# Patient Record
Sex: MALE | Race: WHITE | NOT HISPANIC OR LATINO | Employment: FULL TIME | URBAN - NONMETROPOLITAN AREA
[De-identification: names, ages, dates, MRNs, and addresses within clinical notes are randomized per-mention and may not be internally consistent; named-entity substitution may affect disease eponyms.]

---

## 2022-03-18 ENCOUNTER — HOSPITAL ENCOUNTER (EMERGENCY)
Facility: HOSPITAL | Age: 52
Discharge: HOME | End: 2022-03-18
Attending: EMERGENCY MEDICINE | Admitting: EMERGENCY MEDICINE
Payer: COMMERCIAL

## 2022-03-18 VITALS
TEMPERATURE: 97 F | RESPIRATION RATE: 18 BRPM | WEIGHT: 255 LBS | HEIGHT: 71 IN | BODY MASS INDEX: 35.7 KG/M2 | HEART RATE: 98 BPM | DIASTOLIC BLOOD PRESSURE: 93 MMHG | OXYGEN SATURATION: 96 % | SYSTOLIC BLOOD PRESSURE: 174 MMHG

## 2022-03-18 DIAGNOSIS — S01.311A LACERATION OF HELIX OF RIGHT EAR, INITIAL ENCOUNTER: Primary | ICD-10-CM

## 2022-03-18 PROCEDURE — 99284 EMERGENCY DEPT VISIT MOD MDM: CPT | Performed by: PHYSICIAN ASSISTANT

## 2022-03-18 PROCEDURE — 99282 EMERGENCY DEPT VISIT SF MDM: CPT

## 2022-03-18 PROCEDURE — 12011 RPR F/E/E/N/L/M 2.5 CM/<: CPT | Performed by: PHYSICIAN ASSISTANT

## 2022-03-18 RX ORDER — LIDOCAINE HYDROCHLORIDE 10 MG/ML
10 INJECTION, SOLUTION EPIDURAL; INFILTRATION; INTRACAUDAL; PERINEURAL ONCE
Status: COMPLETED | OUTPATIENT
Start: 2022-03-18 | End: 2022-03-18

## 2022-03-18 RX ORDER — CEPHALEXIN 250 MG/1
500 CAPSULE ORAL ONCE
Status: COMPLETED | OUTPATIENT
Start: 2022-03-18 | End: 2022-03-18

## 2022-03-18 RX ORDER — CEPHALEXIN 500 MG/1
500 CAPSULE ORAL EVERY 6 HOURS SCHEDULED
Qty: 28 CAPSULE | Refills: 0 | Status: SHIPPED | OUTPATIENT
Start: 2022-03-18 | End: 2022-03-25

## 2022-03-18 RX ADMIN — CEPHALEXIN 500 MG: 250 CAPSULE ORAL at 20:46

## 2022-03-18 RX ADMIN — LIDOCAINE HYDROCHLORIDE 10 ML: 10 INJECTION, SOLUTION EPIDURAL; INFILTRATION; INTRACAUDAL; PERINEURAL at 20:03

## 2022-03-18 NOTE — ED PROVIDER NOTES
History  Chief Complaint   Patient presents with    Ear Laceration     pt reports cutting his R ear on an overhead light switch  UTD on tetanus     The patient is a 68-year-old male who presents emergency department today with complaint a laceration ER was working in his attic and accidentally cut his right ear off of a over had lytes which  He states that his last tetanus was within last 2 years  He states due to concern for needing stitches he came in for evaluation denies any loss of consciousness headache, blurred vision  Patient states that this occurred approximately 1 hour prior to arrival      Laceration  Location:  Head/neck  Head/neck laceration location:  R ear  Length:  2 cm   Depth: Through dermis  Quality: jagged    Bleeding: controlled with pressure    Time since incident:  1 hour  Laceration mechanism:  Metal edge  Pain details:     Quality:  Throbbing    Severity:  Mild    Timing:  Constant    Progression:  Unchanged  Foreign body present:  No foreign bodies  Relieved by:  None tried  Worsened by:  Nothing  Ineffective treatments:  None tried  Tetanus status:  Up to date  Associated symptoms: no fever, no focal weakness, no numbness, no rash, no redness, no swelling and no streaking        None       Past Medical History:   Diagnosis Date    Hypertension        History reviewed  No pertinent surgical history  History reviewed  No pertinent family history  I have reviewed and agree with the history as documented  E-Cigarette/Vaping    E-Cigarette Use Never User      E-Cigarette/Vaping Substances     Social History     Tobacco Use    Smoking status: Never Smoker    Smokeless tobacco: Never Used   Vaping Use    Vaping Use: Never used   Substance Use Topics    Alcohol use: Yes    Drug use: Not Currently       Review of Systems   Constitutional: Negative for chills and fever  HENT: Negative for ear pain  Eyes: Negative for pain and visual disturbance     Respiratory: Negative for shortness of breath and stridor  Cardiovascular: Negative for chest pain and palpitations  Gastrointestinal: Negative for abdominal pain, nausea and vomiting  Genitourinary: Negative for dysuria and hematuria  Musculoskeletal: Negative for arthralgias and back pain  Skin: Negative for color change and rash  Neurological: Negative for focal weakness, seizures and speech difficulty  All other systems reviewed and are negative  Physical Exam  Physical Exam  Vitals and nursing note reviewed  Constitutional:       Appearance: He is well-developed  HENT:      Head: Normocephalic and atraumatic  Ears:     Eyes:      Extraocular Movements: Extraocular movements intact  Conjunctiva/sclera: Conjunctivae normal       Pupils: Pupils are equal, round, and reactive to light  Cardiovascular:      Rate and Rhythm: Normal rate and regular rhythm  Heart sounds: No murmur heard  Pulmonary:      Effort: Pulmonary effort is normal  No respiratory distress  Breath sounds: Normal breath sounds  Abdominal:      Palpations: Abdomen is soft  Tenderness: There is no abdominal tenderness  Musculoskeletal:      Cervical back: Neck supple  Skin:     General: Skin is warm and dry  Neurological:      General: No focal deficit present  Mental Status: He is alert and oriented to person, place, and time  GCS: GCS eye subscore is 4  GCS verbal subscore is 5  GCS motor subscore is 6  Gait: Gait is intact               Vital Signs  ED Triage Vitals [03/18/22 1953]   Temperature Pulse Respirations Blood Pressure SpO2   (!) 97 °F (36 1 °C) 98 18 (!) 174/93 96 %      Temp Source Heart Rate Source Patient Position - Orthostatic VS BP Location FiO2 (%)   Temporal Monitor Sitting Right arm --      Pain Score       --           Vitals:    03/18/22 1953   BP: (!) 174/93   Pulse: 98   Patient Position - Orthostatic VS: Sitting         Visual Acuity      ED Medications  Medications lidocaine (PF) (XYLOCAINE-MPF) 1 % injection 10 mL (10 mL Infiltration Given 3/18/22 2003)   cephalexin (KEFLEX) capsule 500 mg (500 mg Oral Given 3/18/22 2046)       Diagnostic Studies  Results Reviewed     None                 No orders to display              Procedures  Laceration repair    Date/Time: 3/18/2022 11:46 PM  Performed by: Jaxon Tovar PA-C  Authorized by: Jaxon Tovar PA-C   Consent: Verbal consent obtained  Risks and benefits: risks, benefits and alternatives were discussed  Consent given by: patient  Patient identity confirmed: verbally with patient  Body area: head/neck  Location details: right ear  Laceration length: 2 cm  Tendon involvement: none  Nerve involvement: none  Anesthesia: nerve block    Anesthesia:  Local Anesthetic: lidocaine 1% without epinephrine  Anesthetic total: 5 mL    Wound Dehiscence:  Superficial Wound Dehiscence: simple closure      Procedure Details:  Preparation: Patient was prepped and draped in the usual sterile fashion  Irrigation solution: tap water  Irrigation method: syringe  Amount of cleaning: standard  Debridement: none  Degree of undermining: none  Skin closure: Ethilon  Number of sutures: 7  Technique: simple  Approximation: close  Approximation difficulty: simple  Dressing: non-adhesive packing strip and 4x4 sterile gauze  Patient tolerance: patient tolerated the procedure well with no immediate complications               ED Course  ED Course as of 03/18/22 2349   Fri Mar 18, 2022   2044 7stitches placed                                 SBIRT 22yo+      Most Recent Value   SBIRT (25 yo +)    In order to provide better care to our patients, we are screening all of our patients for alcohol and drug use  Would it be okay to ask you these screening questions? Yes Filed at: 03/18/2022 2000   Initial Alcohol Screen: US AUDIT-C     1  How often do you have a drink containing alcohol? 0 Filed at: 03/18/2022 2000   2   How many drinks containing alcohol do you have on a typical day you are drinking? 0 Filed at: 03/18/2022 2000   3a  Male UNDER 65: How often do you have five or more drinks on one occasion? 0 Filed at: 03/18/2022 2000   3b  FEMALE Any Age, or MALE 65+: How often do you have 4 or more drinks on one occassion? 0 Filed at: 03/18/2022 2000   Audit-C Score 0 Filed at: 03/18/2022 2000   DUSTIN: How many times in the past year have you    Used an illegal drug or used a prescription medication for non-medical reasons? Never Filed at: 03/18/2022 2000                    [facility]  Number of Diagnoses or Management Options     Amount and/or Complexity of Data Reviewed  Decide to obtain previous medical records or to obtain history from someone other than the patient: yes  Review and summarize past medical records: yes  Independent visualization of images, tracings, or specimens: yes    Risk of Complications, Morbidity, and/or Mortality  Presenting problems: low  Diagnostic procedures: low  Management options: low    Patient Progress  Patient progress: improved      Disposition  Final diagnoses:   Laceration of helix of right ear, initial encounter     Time reflects when diagnosis was documented in both MDM as applicable and the Disposition within this note     Time User Action Codes Description Comment    3/18/2022  8:43 PM Ramona Samaniego Add [S01 311A] Laceration of helix of right ear, initial encounter       ED Disposition     ED Disposition Condition Date/Time Comment    Discharge Stable Fri Mar 18, 2022  8:43 PM Alvin Massy  discharge to home/self care  Follow-up Information    None         Discharge Medication List as of 3/18/2022  8:44 PM      START taking these medications    Details   cephalexin (KEFLEX) 500 mg capsule Take 1 capsule (500 mg total) by mouth every 6 (six) hours for 7 days, Starting Fri 3/18/2022, Until Fri 3/25/2022, Normal             No discharge procedures on file      PDMP Review     None          ED Provider  Electronically Signed by           Alem Hinojosa PA-C  03/18/22 2589

## 2024-12-12 ENCOUNTER — HOSPITAL ENCOUNTER (OUTPATIENT)
Facility: HOSPITAL | Age: 54
Setting detail: OBSERVATION
Discharge: HOME | End: 2024-12-13
Attending: EMERGENCY MEDICINE | Admitting: INTERNAL MEDICINE
Payer: COMMERCIAL

## 2024-12-12 ENCOUNTER — APPOINTMENT (EMERGENCY)
Dept: CT IMAGING | Facility: HOSPITAL | Age: 54
End: 2024-12-12
Payer: COMMERCIAL

## 2024-12-12 DIAGNOSIS — R52 INTRACTABLE PAIN: ICD-10-CM

## 2024-12-12 DIAGNOSIS — S39.012A STRAIN OF LUMBAR REGION, INITIAL ENCOUNTER: Primary | ICD-10-CM

## 2024-12-12 PROBLEM — I10 HYPERTENSION: Status: ACTIVE | Noted: 2024-12-12

## 2024-12-12 PROBLEM — E11.9 TYPE 2 DIABETES MELLITUS WITHOUT COMPLICATION, WITHOUT LONG-TERM CURRENT USE OF INSULIN (HCC): Status: ACTIVE | Noted: 2024-12-12

## 2024-12-12 PROBLEM — M54.59 INTRACTABLE LOW BACK PAIN: Status: ACTIVE | Noted: 2024-12-12

## 2024-12-12 LAB
ANION GAP SERPL CALCULATED.3IONS-SCNC: 10 MMOL/L (ref 4–13)
BASOPHILS # BLD AUTO: 0.04 THOUSANDS/ÂΜL (ref 0–0.1)
BASOPHILS NFR BLD AUTO: 1 % (ref 0–1)
BUN SERPL-MCNC: 13 MG/DL (ref 5–25)
CALCIUM SERPL-MCNC: 9.5 MG/DL (ref 8.4–10.2)
CHLORIDE SERPL-SCNC: 101 MMOL/L (ref 96–108)
CO2 SERPL-SCNC: 25 MMOL/L (ref 21–32)
CREAT SERPL-MCNC: 0.8 MG/DL (ref 0.6–1.3)
EOSINOPHIL # BLD AUTO: 0.09 THOUSAND/ÂΜL (ref 0–0.61)
EOSINOPHIL NFR BLD AUTO: 1 % (ref 0–6)
ERYTHROCYTE [DISTWIDTH] IN BLOOD BY AUTOMATED COUNT: 11.6 % (ref 11.6–15.1)
GFR SERPL CREATININE-BSD FRML MDRD: 102 ML/MIN/1.73SQ M
GLUCOSE SERPL-MCNC: 156 MG/DL (ref 65–140)
HCT VFR BLD AUTO: 46.6 % (ref 36.5–49.3)
HGB BLD-MCNC: 15.9 G/DL (ref 12–17)
IMM GRANULOCYTES # BLD AUTO: 0.03 THOUSAND/UL (ref 0–0.2)
IMM GRANULOCYTES NFR BLD AUTO: 0 % (ref 0–2)
LYMPHOCYTES # BLD AUTO: 0.79 THOUSANDS/ÂΜL (ref 0.6–4.47)
LYMPHOCYTES NFR BLD AUTO: 10 % (ref 14–44)
MCH RBC QN AUTO: 32.3 PG (ref 26.8–34.3)
MCHC RBC AUTO-ENTMCNC: 34.1 G/DL (ref 31.4–37.4)
MCV RBC AUTO: 95 FL (ref 82–98)
MONOCYTES # BLD AUTO: 0.53 THOUSAND/ÂΜL (ref 0.17–1.22)
MONOCYTES NFR BLD AUTO: 6 % (ref 4–12)
NEUTROPHILS # BLD AUTO: 6.8 THOUSANDS/ÂΜL (ref 1.85–7.62)
NEUTS SEG NFR BLD AUTO: 82 % (ref 43–75)
NRBC BLD AUTO-RTO: 0 /100 WBCS
PLATELET # BLD AUTO: 277 THOUSANDS/UL (ref 149–390)
PMV BLD AUTO: 9.8 FL (ref 8.9–12.7)
POTASSIUM SERPL-SCNC: 3.9 MMOL/L (ref 3.5–5.3)
RBC # BLD AUTO: 4.93 MILLION/UL (ref 3.88–5.62)
SODIUM SERPL-SCNC: 136 MMOL/L (ref 135–147)
WBC # BLD AUTO: 8.28 THOUSAND/UL (ref 4.31–10.16)

## 2024-12-12 PROCEDURE — 96361 HYDRATE IV INFUSION ADD-ON: CPT

## 2024-12-12 PROCEDURE — 36415 COLL VENOUS BLD VENIPUNCTURE: CPT | Performed by: EMERGENCY MEDICINE

## 2024-12-12 PROCEDURE — 99223 1ST HOSP IP/OBS HIGH 75: CPT | Performed by: INTERNAL MEDICINE

## 2024-12-12 PROCEDURE — 96375 TX/PRO/DX INJ NEW DRUG ADDON: CPT

## 2024-12-12 PROCEDURE — 96374 THER/PROPH/DIAG INJ IV PUSH: CPT

## 2024-12-12 PROCEDURE — 85025 COMPLETE CBC W/AUTO DIFF WBC: CPT | Performed by: EMERGENCY MEDICINE

## 2024-12-12 PROCEDURE — 99285 EMERGENCY DEPT VISIT HI MDM: CPT | Performed by: EMERGENCY MEDICINE

## 2024-12-12 PROCEDURE — 99284 EMERGENCY DEPT VISIT MOD MDM: CPT

## 2024-12-12 PROCEDURE — 80048 BASIC METABOLIC PNL TOTAL CA: CPT | Performed by: EMERGENCY MEDICINE

## 2024-12-12 PROCEDURE — 74177 CT ABD & PELVIS W/CONTRAST: CPT

## 2024-12-12 PROCEDURE — 96376 TX/PRO/DX INJ SAME DRUG ADON: CPT

## 2024-12-12 RX ORDER — METHOCARBAMOL 500 MG/1
500 TABLET, FILM COATED ORAL EVERY 6 HOURS SCHEDULED
Status: DISCONTINUED | OUTPATIENT
Start: 2024-12-13 | End: 2024-12-13 | Stop reason: HOSPADM

## 2024-12-12 RX ORDER — HYDROMORPHONE HCL/PF 1 MG/ML
1 SYRINGE (ML) INJECTION ONCE
Refills: 0 | Status: COMPLETED | OUTPATIENT
Start: 2024-12-12 | End: 2024-12-12

## 2024-12-12 RX ORDER — ONDANSETRON 2 MG/ML
4 INJECTION INTRAMUSCULAR; INTRAVENOUS EVERY 4 HOURS PRN
Status: DISCONTINUED | OUTPATIENT
Start: 2024-12-12 | End: 2024-12-13 | Stop reason: HOSPADM

## 2024-12-12 RX ORDER — LISINOPRIL 20 MG/1
20 TABLET ORAL DAILY
Status: DISCONTINUED | OUTPATIENT
Start: 2024-12-13 | End: 2024-12-13 | Stop reason: HOSPADM

## 2024-12-12 RX ORDER — ACETAMINOPHEN 325 MG/1
975 TABLET ORAL EVERY 8 HOURS SCHEDULED
Status: DISCONTINUED | OUTPATIENT
Start: 2024-12-12 | End: 2024-12-13 | Stop reason: HOSPADM

## 2024-12-12 RX ORDER — HYDROMORPHONE HCL/PF 1 MG/ML
0.5 SYRINGE (ML) INJECTION ONCE
Status: DISCONTINUED | OUTPATIENT
Start: 2024-12-12 | End: 2024-12-12

## 2024-12-12 RX ORDER — LISINOPRIL 20 MG/1
20 TABLET ORAL DAILY
COMMUNITY

## 2024-12-12 RX ORDER — KETOROLAC TROMETHAMINE 30 MG/ML
30 INJECTION, SOLUTION INTRAMUSCULAR; INTRAVENOUS ONCE
Status: COMPLETED | OUTPATIENT
Start: 2024-12-12 | End: 2024-12-12

## 2024-12-12 RX ORDER — OXYCODONE HYDROCHLORIDE 5 MG/1
5 TABLET ORAL EVERY 4 HOURS PRN
Refills: 0 | Status: DISCONTINUED | OUTPATIENT
Start: 2024-12-12 | End: 2024-12-13 | Stop reason: HOSPADM

## 2024-12-12 RX ORDER — KETOROLAC TROMETHAMINE 30 MG/ML
15 INJECTION, SOLUTION INTRAMUSCULAR; INTRAVENOUS EVERY 6 HOURS PRN
Status: DISCONTINUED | OUTPATIENT
Start: 2024-12-12 | End: 2024-12-13 | Stop reason: HOSPADM

## 2024-12-12 RX ORDER — AMOXICILLIN 250 MG
1 CAPSULE ORAL
Status: DISCONTINUED | OUTPATIENT
Start: 2024-12-12 | End: 2024-12-13 | Stop reason: HOSPADM

## 2024-12-12 RX ORDER — DIAZEPAM 10 MG/2ML
5 INJECTION, SOLUTION INTRAMUSCULAR; INTRAVENOUS ONCE
Status: DISCONTINUED | OUTPATIENT
Start: 2024-12-12 | End: 2024-12-12

## 2024-12-12 RX ORDER — DIAZEPAM 10 MG/2ML
5 INJECTION, SOLUTION INTRAMUSCULAR; INTRAVENOUS ONCE
Status: COMPLETED | OUTPATIENT
Start: 2024-12-12 | End: 2024-12-12

## 2024-12-12 RX ORDER — HYDROMORPHONE HCL IN WATER/PF 6 MG/30 ML
0.2 PATIENT CONTROLLED ANALGESIA SYRINGE INTRAVENOUS EVERY 6 HOURS PRN
Status: DISCONTINUED | OUTPATIENT
Start: 2024-12-12 | End: 2024-12-13 | Stop reason: HOSPADM

## 2024-12-12 RX ORDER — ENOXAPARIN SODIUM 100 MG/ML
40 INJECTION SUBCUTANEOUS
Status: DISCONTINUED | OUTPATIENT
Start: 2024-12-13 | End: 2024-12-13 | Stop reason: HOSPADM

## 2024-12-12 RX ORDER — LIDOCAINE 50 MG/G
1 PATCH TOPICAL DAILY
Status: DISCONTINUED | OUTPATIENT
Start: 2024-12-13 | End: 2024-12-13 | Stop reason: HOSPADM

## 2024-12-12 RX ADMIN — ACETAMINOPHEN 325MG 975 MG: 325 TABLET ORAL at 22:07

## 2024-12-12 RX ADMIN — IOHEXOL 100 ML: 350 INJECTION, SOLUTION INTRAVENOUS at 19:38

## 2024-12-12 RX ADMIN — HYDROMORPHONE HYDROCHLORIDE 1 MG: 1 INJECTION, SOLUTION INTRAMUSCULAR; INTRAVENOUS; SUBCUTANEOUS at 20:05

## 2024-12-12 RX ADMIN — METHOCARBAMOL TABLETS 500 MG: 500 TABLET, COATED ORAL at 23:21

## 2024-12-12 RX ADMIN — SENNOSIDES AND DOCUSATE SODIUM 1 TABLET: 8.6; 5 TABLET ORAL at 22:07

## 2024-12-12 RX ADMIN — DIAZEPAM 5 MG: 5 INJECTION INTRAMUSCULAR; INTRAVENOUS at 20:06

## 2024-12-12 RX ADMIN — HYDROMORPHONE HYDROCHLORIDE 1 MG: 1 INJECTION, SOLUTION INTRAMUSCULAR; INTRAVENOUS; SUBCUTANEOUS at 19:01

## 2024-12-12 RX ADMIN — KETOROLAC TROMETHAMINE 30 MG: 30 INJECTION, SOLUTION INTRAMUSCULAR; INTRAVENOUS at 20:01

## 2024-12-12 RX ADMIN — SODIUM CHLORIDE 1000 ML: 0.9 INJECTION, SOLUTION INTRAVENOUS at 19:00

## 2024-12-12 NOTE — ED PROVIDER NOTES
Time reflects when diagnosis was documented in both MDM as applicable and the Disposition within this note       Time User Action Codes Description Comment    12/12/2024  8:45 PM Ghanshyam Thrasher Add [S39.012A] Strain of lumbar region, initial encounter     12/12/2024  8:46 PM Ghanshyam Thrasher Add [R52] Intractable pain           ED Disposition       ED Disposition   Admit    Condition   Stable    Date/Time   Thu Dec 12, 2024  8:45 PM    Comment   Case was discussed with Naldo and the patient's admission status was agreed to be Admission Status: observation status to the service of Dr. Hitchcock .               Assessment & Plan       Medical Decision Making  This patient presents with left lower back pain.   Diagnostic considerations include lumbar strain, renal colic,. See ED Course.       Amount and/or Complexity of Data Reviewed  Labs: ordered. Decision-making details documented in ED Course.  Radiology: ordered and independent interpretation performed. Decision-making details documented in ED Course.  ECG/medicine tests: ordered and independent interpretation performed. Decision-making details documented in ED Course.    Risk  Prescription drug management.        ED Course as of 12/12/24 2048   Thu Dec 12, 2024   1929 WBC: 8.28   1929 GFR, Calculated: 102   1953 Deep tendon reflexes 2/4 at knees and ankles bilaterally  Notes pain improved when still in supine, but remained severe with any/minimal movement   2038 Supine in bed unable to move without spasmodic left lower back pain, agreeable with additional analgesic and antispasmodic, discussed options including observation hospital and agreeable, spouse present and supportive       Medications   diazepam (VALIUM) injection 5 mg (has no administration in time range)   HYDROmorphone (DILAUDID) injection 0.5 mg (has no administration in time range)   sodium chloride 0.9 % bolus 1,000 mL (0 mL Intravenous Stopped 12/12/24 2000)   HYDROmorphone (DILAUDID) injection  1 mg (1 mg Intravenous Given 12/12/24 1901)   iohexol (OMNIPAQUE) 350 MG/ML injection (MULTI-DOSE) 100 mL (100 mL Intravenous Given 12/12/24 1938)   HYDROmorphone (DILAUDID) injection 1 mg (1 mg Intravenous Given 12/12/24 2005)   ketorolac (TORADOL) injection 30 mg (30 mg Intravenous Given 12/12/24 2001)   diazepam (VALIUM) injection 5 mg (5 mg Intravenous Given 12/12/24 2006)       ED Risk Strat Scores                                              History of Present Illness       Chief Complaint   Patient presents with    Back Pain     Back pain worsening over a week. Denies injury       Past Medical History:   Diagnosis Date    Hypertension       History reviewed. No pertinent surgical history.   History reviewed. No pertinent family history.   Social History     Tobacco Use    Smoking status: Never    Smokeless tobacco: Never   Vaping Use    Vaping status: Never Used   Substance Use Topics    Alcohol use: Yes    Drug use: Not Currently      E-Cigarette/Vaping    E-Cigarette Use Never User       E-Cigarette/Vaping Substances      I have reviewed and agree with the history as documented.     54-year-old male accompanied by female describes severe left lower back pain ongoing for about a week, worse with movement, hunting and believes related to positioning and pulled something, but markedly worse this morning when got out of bed, movement increases pain.  No numbness or weakness.  No fever or chills.  No direct injury.      History provided by:  Patient  Back Pain  Location:  Lumbar spine  Quality:  Aching  Radiates to:  Does not radiate  Pain severity:  Severe  Onset quality:  Gradual  Duration:  7 days  Timing:  Constant  Progression:  Worsening  Chronicity:  New  Context: physical stress    Relieved by:  Nothing  Worsened by:  Movement  Ineffective treatments:  NSAIDs  Associated symptoms: no bladder incontinence, no bowel incontinence, no fever, no numbness and no paresthesias    Risk factors: no hx of cancer         Review of Systems   Constitutional:  Negative for fever.   Gastrointestinal:  Negative for bowel incontinence.   Genitourinary:  Negative for bladder incontinence.   Musculoskeletal:  Positive for back pain.   Neurological:  Negative for numbness and paresthesias.   All other systems reviewed and are negative.          Objective       ED Triage Vitals   Temperature Pulse Blood Pressure Respirations SpO2 Patient Position - Orthostatic VS   12/12/24 1834 12/12/24 1834 12/12/24 1835 12/12/24 1834 12/12/24 1834 12/12/24 1915   98.2 °F (36.8 °C) 93 (!) 193/127 18 98 % Lying      Temp src Heart Rate Source BP Location FiO2 (%) Pain Score    -- 12/12/24 1834 12/12/24 1834 -- 12/12/24 1901     Monitor Left arm  6      Vitals      Date and Time Temp Pulse SpO2 Resp BP Pain Score FACES Pain Rating User   12/12/24 2005 -- -- -- -- -- 6 --    12/12/24 2001 -- -- -- -- -- 6 --    12/12/24 1930 -- 77 95 % 18 153/77 -- --    12/12/24 1915 -- 74 92 % 18 162/81 -- --    12/12/24 1901 -- -- -- -- -- 6 WITHOUT MOVEMENT, 10 WITH MOVEMENT --    12/12/24 1900 -- 85 93 % 18 157/91 -- --    12/12/24 1835 -- -- -- -- 193/127 -- --    12/12/24 1834 98.2 °F (36.8 °C) 93 98 % 18 -- -- -- SS            Physical Exam  Vitals and nursing note reviewed.   Constitutional:       General: He is not in acute distress.     Appearance: Normal appearance.      Comments: Pleasant, comfortable appearing, conversational, articulate   HENT:      Head: Normocephalic and atraumatic.      Right Ear: External ear normal.      Left Ear: External ear normal.      Nose: Nose normal.      Mouth/Throat:      Mouth: Mucous membranes are moist.   Eyes:      Extraocular Movements: Extraocular movements intact.      Pupils: Pupils are equal, round, and reactive to light.   Cardiovascular:      Rate and Rhythm: Normal rate and regular rhythm.      Heart sounds: No murmur heard.  Pulmonary:      Effort: Pulmonary effort is normal. No respiratory  distress.      Breath sounds: Normal breath sounds. No wheezing or rales.   Abdominal:      General: Abdomen is flat. Bowel sounds are normal. There is no distension.      Tenderness: There is no abdominal tenderness. There is no guarding or rebound.   Musculoskeletal:      Cervical back: Neck supple.      Right lower leg: No edema.      Left lower leg: No edema.      Comments: Left paralumbar muscular tenderness, no midline spinal tenderness in cervical, thoracic or lumbar spine, no overlying skin changes, great toe strength intact in flexion extension bilaterally, saddle area sensation intact, ambulates with good balance and discomfort   Skin:     General: Skin is warm and dry.   Neurological:      General: No focal deficit present.      Mental Status: He is alert and oriented to person, place, and time.      Cranial Nerves: No cranial nerve deficit.      Sensory: No sensory deficit.      Motor: No weakness.      Coordination: Coordination normal.   Psychiatric:         Mood and Affect: Mood normal.         Behavior: Behavior normal.         Results Reviewed       Procedure Component Value Units Date/Time    Basic metabolic panel [[id number]]  (Abnormal) Collected: 12/12/24 1859    Lab Status: Final result Specimen: Blood from Arm, Right Updated: 12/12/24 1924     Sodium 136 mmol/L      Potassium 3.9 mmol/L      Chloride 101 mmol/L      CO2 25 mmol/L      ANION GAP 10 mmol/L      BUN 13 mg/dL      Creatinine 0.80 mg/dL      Glucose 156 mg/dL      Calcium 9.5 mg/dL      eGFR 102 ml/min/1.73sq m     Narrative:      National Kid[employer] Foundation guidelines for Chronic Kidney Disease (CKD):     Stage 1 with normal or high GFR (GFR > 90 mL/min/1.73 square meters)    Stage 2 Mild CKD (GFR = 60-89 mL/min/1.73 square meters)    Stage 3A Moderate CKD (GFR = 45-59 mL/min/1.73 square meters)    Stage 3B Moderate CKD (GFR = 30-44 mL/min/1.73 square meters)    Stage 4 Severe CKD (GFR = 15-29 mL/min/1.73 square meters)     Stage 5 End Stage CKD (GFR <15 mL/min/1.73 square meters)  Note: GFR calculation is accurate only with a steady state creatinine    CBC and differential [[id number]]  (Abnormal) Collected: 12/12/24 1859    Lab Status: Final result Specimen: Blood from Arm, Right Updated: 12/12/24 1911     WBC 8.28 Thousand/uL      RBC 4.93 Million/uL      Hemoglobin 15.9 g/dL      Hematocrit 46.6 %      MCV 95 fL      MCH 32.3 pg      MCHC 34.1 g/dL      RDW 11.6 %      MPV 9.8 fL      Platelets 277 Thousands/uL      nRBC 0 /100 WBCs      Segmented % 82 %      Immature Grans % 0 %      Lymphocytes % 10 %      Monocytes % 6 %      Eosinophils Relative 1 %      Basophils Relative 1 %      Absolute Neutrophils 6.80 Thousands/µL      Absolute Immature Grans 0.03 Thousand/uL      Absolute Lymphocytes 0.79 Thousands/µL      Absolute Monocytes 0.53 Thousand/µL      Eosinophils Absolute 0.09 Thousand/µL      Basophils Absolute 0.04 Thousands/µL             CT abdomen pelvis with contrast    (Results Pending)       Procedures    ED Medication and Procedure Management   None     Patient's Medications    No medications on file     No discharge procedures on file.  ED SEPSIS DOCUMENTATION   Time reflects when diagnosis was documented in both MDM as applicable and the Disposition within this note       Time User Action Codes Description Comment    12/12/2024  8:45 PM Ghanshyam Thrasher Add [S39.012A] Strain of lumbar region, initial encounter     12/12/2024  8:46 PM Ghanshyam Thrasher Add [R52] Intractable pain                  Ghanshyam Thrasher DO  12/12/24 2048

## 2024-12-12 NOTE — ED NOTES
Pt states he took 1000mg naproxen around 3pm but didn't feel it helped any. Pt states he was unable to get out of his chair today      Chayo Milton RN  12/12/24 0896

## 2024-12-13 VITALS
RESPIRATION RATE: 20 BRPM | OXYGEN SATURATION: 96 % | BODY MASS INDEX: 36.31 KG/M2 | SYSTOLIC BLOOD PRESSURE: 149 MMHG | HEART RATE: 61 BPM | HEIGHT: 70 IN | DIASTOLIC BLOOD PRESSURE: 84 MMHG | TEMPERATURE: 97.7 F | WEIGHT: 253.6 LBS

## 2024-12-13 PROCEDURE — 99239 HOSP IP/OBS DSCHRG MGMT >30: CPT | Performed by: FAMILY MEDICINE

## 2024-12-13 RX ORDER — IBUPROFEN 600 MG/1
600 TABLET, FILM COATED ORAL EVERY 8 HOURS PRN
Qty: 30 TABLET | Refills: 0 | Status: SHIPPED | OUTPATIENT
Start: 2024-12-13

## 2024-12-13 RX ORDER — METHOCARBAMOL 500 MG/1
500 TABLET, FILM COATED ORAL EVERY 6 HOURS PRN
Qty: 20 TABLET | Refills: 0 | Status: SHIPPED | OUTPATIENT
Start: 2024-12-13

## 2024-12-13 RX ADMIN — METHOCARBAMOL TABLETS 500 MG: 500 TABLET, COATED ORAL at 05:21

## 2024-12-13 RX ADMIN — LIDOCAINE 1 PATCH: 50 PATCH CUTANEOUS at 08:18

## 2024-12-13 RX ADMIN — LISINOPRIL 20 MG: 20 TABLET ORAL at 08:18

## 2024-12-13 RX ADMIN — ACETAMINOPHEN 325MG 975 MG: 325 TABLET ORAL at 05:21

## 2024-12-13 NOTE — PLAN OF CARE
Problem: PAIN - ADULT  Goal: Verbalizes/displays adequate comfort level or baseline comfort level  Description: Interventions:  - Encourage patient to monitor pain and request assistance  - Assess pain using appropriate pain scale  - Administer analgesics based on type and severity of pain and evaluate response  - Implement non-pharmacological measures as appropriate and evaluate response  - Consider cultural and social influences on pain and pain management  - Notify physician/advanced practitioner if interventions unsuccessful or patient reports new pain  Outcome: Progressing     Problem: SAFETY ADULT  Goal: Patient will remain free of falls  Description: INTERVENTIONS:  - Educate patient/family on patient safety including physical limitations  - Instruct patient to call for assistance with activity   - Consult OT/PT to assist with strengthening/mobility   - Keep Call bell within reach  - Keep bed low and locked with side rails adjusted as appropriate  - Keep care items and personal belongings within reach  - Initiate and maintain comfort rounds  - Make Fall Risk Sign visible to staff  - Offer Toileting every    Hours, in advance of need  - Initiate/Maintain   alarm  - Obtain necessary fall risk management equipment:     - Apply yellow socks and bracelet for high fall risk patients  - Consider moving patient to room near nurses station  Outcome: Progressing  Goal: Maintain or return to baseline ADL function  Description: INTERVENTIONS:  -  Assess patient's ability to carry out ADLs; assess patient's baseline for ADL function and identify physical deficits which impact ability to perform ADLs (bathing, care of mouth/teeth, toileting, grooming, dressing, etc.)  - Assess/evaluate cause of self-care deficits   - Assess range of motion  - Assess patient's mobility; develop plan if impaired  - Assess patient's need for assistive devices and provide as appropriate  - Encourage maximum independence but intervene and  supervise when necessary  - Involve family in performance of ADLs  - Assess for home care needs following discharge   - Consider OT consult to assist with ADL evaluation and planning for discharge  - Provide patient education as appropriate  Outcome: Progressing  Goal: Maintains/Returns to pre admission functional level  Description: INTERVENTIONS:  - Perform AM-PAC 6 Click Basic Mobility/ Daily Activity assessment daily.  - Set and communicate daily mobility goal to care team and patient/family/caregiver.   - Collaborate with rehabilitation services on mobility goals if consulted  - Perform Range of Motion    times a day.  - Reposition patient every    hours.  - Dangle patient    times a day  - Stand patient    times a day  - Ambulate patient    times a day  - Out of bed to chair    times a day   - Out of bed for meals      times a day  - Out of bed for toileting  - Record patient progress and toleration of activity level   Outcome: Progressing     Problem: DISCHARGE PLANNING  Goal: Discharge to home or other facility with appropriate resources  Description: INTERVENTIONS:  - Identify barriers to discharge w/patient and caregiver  - Arrange for needed discharge resources and transportation as appropriate  - Identify discharge learning needs (meds, wound care, etc.)  - Arrange for interpretive services to assist at discharge as needed  - Refer to Case Management Department for coordinating discharge planning if the patient needs post-hospital services based on physician/advanced practitioner order or complex needs related to functional status, cognitive ability, or social support system  Outcome: Progressing     Problem: Knowledge Deficit  Goal: Patient/family/caregiver demonstrates understanding of disease process, treatment plan, medications, and discharge instructions  Description: Complete learning assessment and assess knowledge base.  Interventions:  - Provide teaching at level of understanding  - Provide  teaching via preferred learning methods  Outcome: Progressing

## 2024-12-13 NOTE — ASSESSMENT & PLAN NOTE
"Lab Results   Component Value Date    HGBA1C 6.7 (H) 09/10/2024       No results for input(s): \"POCGLU\" in the last 72 hours.    Blood Sugar Average: Last 72 hrs:  Not on any medications.  Continue with serial blood glucose monitoring and sliding scale insulin.    "

## 2024-12-13 NOTE — UTILIZATION REVIEW
Initial Clinical Review    Admission: Date/Time/Statement:   Admission Orders (From admission, onward)       Ordered        12/12/24 2048  Place in Observation  Once                          Orders Placed This Encounter   Procedures    Place in Observation     Standing Status:   Standing     Number of Occurrences:   1     Level of Care:   Med Surg [16]     ED Arrival Information       Expected   -    Arrival   12/12/2024 18:29    Acuity   Less Urgent              Means of arrival   Walk-In    Escorted by   Family Member    Service   Hospitalist    Admission type   Emergency              Arrival complaint   Back Pain             Chief Complaint   Patient presents with    Back Pain     Back pain worsening over a week. Denies injury       Initial Presentation: 54 y.o. male to ED via walk-in from home  Present to ED with worsening back pain. Recently tripped and fell a couple of time while in the woods hunting. Endorses was unable to get out of bed today to walk. Pain located in the left lower back without any radiation.   PMHX hypertension   Admitted to OBS with DX: back pain   on exam: hypertensive; Mild lower lumbar tenderness; pain 6/10   PLAN: pain control (see below); monitor labs; PT/ OT eval - tx; monitor / trend BP's       Anticipated Length of Stay/Certification Statement: Patient will be admitted on an observation basis with an anticipated length of stay of less than 2 midnights secondary to management as documented above.           ED Treatment-Medication Administration from 12/12/2024 1826 to 12/12/2024 2118         Date/Time Order Dose Route Action     12/12/2024 1900 sodium chloride 0.9 % bolus 1,000 mL 1,000 mL Intravenous New Bag     12/12/2024 1901 HYDROmorphone (DILAUDID) injection 1 mg 1 mg Intravenous Given     12/12/2024 1938 iohexol (OMNIPAQUE) 350 MG/ML injection (MULTI-DOSE) 100 mL 100 mL Intravenous Given     12/12/2024 2005 HYDROmorphone (DILAUDID) injection 1 mg 1 mg Intravenous Given      12/12/2024 2001 ketorolac (TORADOL) injection 30 mg 30 mg Intravenous Given     12/12/2024 2006 diazepam (VALIUM) injection 5 mg 5 mg Intravenous Given            Scheduled Medications:  acetaminophen, 975 mg, Oral, Q8H [facility]  enoxaparin, 40 mg, Subcutaneous, Q24H [facility]  lidocaine, 1 patch, Topical, Daily  lisinopril, 20 mg, Oral, Daily  methocarbamol, 500 mg, Oral, Q6H [facility]  senna-docusate sodium, 1 tablet, Oral, HS      Continuous IV Infusions:     PRN Meds:  HYDROmorphone, 0.2 mg, Intravenous, Q6H PRN  ketorolac, 15 mg, Intravenous, Q6H PRN  naloxone, 0.04 mg, Intravenous, Q1MIN PRN  ondansetron, 4 mg, Intravenous, Q4H PRN  oxyCODONE, 5 mg, Oral, Q4H PRN      ED Triage Vitals   Temperature Pulse Respirations Blood Pressure SpO2 Pain Score   12/12/24 1834 12/12/24 1834 12/12/24 1834 12/12/24 1835 12/12/24 1834 12/12/24 1901   98.2 °F (36.8 °C) 93 18 (!) 193/127 98 % 6     Weight (last 2 days)       Date/Time Weight    12/12/24 2209 115 (253.6)    12/12/24 2118 115 (253.6)    12/12/24 1834 116 (255)            Vital Signs (last 3 days)       Date/Time Temp Pulse Resp BP MAP (mmHg) SpO2 O2 Device Patient Position - Orthostatic VS Pain    12/13/24 07:17:36 97.7 °F (36.5 °C) 61 -- 149/84 106 96 % -- -- --    12/13/24 0521 -- -- -- -- -- -- -- -- 4 12/12/24 23:10:13 98.3 °F (36.8 °C) 64 20 146/88 107 96 % -- -- --    12/12/24 2209 98.1 °F (36.7 °C) 68 20 161/96 -- -- -- -- --    12/12/24 2207 -- -- -- -- -- -- -- -- 4 12/12/24 2130 -- -- -- -- -- 95 % None (Room air) -- --    12/12/24 2128 -- -- -- -- -- -- -- -- 2    12/12/24 21:27:19 98.1 °F (36.7 °C) 68 -- 161/96 118 95 % -- -- --    12/12/24 21:24:30 -- 68 20 161/96 118 96 % -- -- --    12/12/24 2005 -- -- -- -- -- -- -- -- 6    12/12/24 2001 -- -- -- -- -- -- -- -- 6 12/12/24 1930 -- 77 18 153/77 109 95 % None (Room air) Lying --    12/12/24 1915 -- 74 18 162/81 113 92 % None (Room air) Lying --    12/12/24 1901 -- -- -- -- -- -- -- -- 6 12/12/24 1900 -- 85  18 157/91 118 93 % -- -- --    12/12/24 1835 -- -- -- 193/127 -- -- -- -- --    12/12/24 1834 98.2 °F (36.8 °C) 93 18 -- -- 98 % None (Room air) -- --              Pertinent Labs/Diagnostic Test Results:   Radiology:  CT abdomen pelvis with contrast   Final Interpretation by Amari Sam MD (12/12 2050)      No acute inflammatory process identified within the abdomen or pelvis.         Workstation performed: [id number]             Results from last 7 days   Lab Units 12/12/24  1859   WBC Thousand/uL 8.28   HEMOGLOBIN g/dL 15.9   HEMATOCRIT % 46.6   PLATELETS Thousands/uL 277   TOTAL NEUT ABS Thousands/µL 6.80        Results from last 7 days   Lab Units 12/12/24  1859   SODIUM mmol/L 136   POTASSIUM mmol/L 3.9   CHLORIDE mmol/L 101   CO2 mmol/L 25   ANION GAP mmol/L 10   BUN mg/dL 13   CREATININE mg/dL 0.80   EGFR ml/min/1.73sq m 102   CALCIUM mg/dL 9.5        Results from last 7 days   Lab Units 12/12/24  1859   GLUCOSE RANDOM mg/dL 156*          Past Medical History:   Diagnosis Date    Hypertension        Admitting Diagnosis: Back pain [M54.9]  Intractable pain [R52]  Strain of lumbar region, initial encounter [S39.012A]  Age/Sex: 54 y.o. male    Network Utilization Review Department  ATTENTION: Please call with any questions or concerns to [phone] and carefully listen to the prompts so that you are directed to the right person. All voicemails are confidential.   For Discharge needs, contact Care Management DC Support Team at [phone] opt. 2  Send all requests for admission clinical reviews, approved or denied determinations and any other requests to dedicated fax number below belonging to the campus where the patient is receiving treatment. List of dedicated fax numbers for the Facilities:  FACILITY NAME UR FAX NUMBER   ADMISSION DENIALS (Administrative/Medical Necessity) [phone]   DISCHARGE SUPPORT TEAM (NETWORK) [phone]   PARENT CHILD HEALTH (Maternity/NICU/Pediatrics) [phone]   [facility]  [facility] [phone]   [facility] [phone]   [facility] [phone]   [facility] [phone]   [facility] [phone]   [facility] [phone]   [facility] [phone]   [facility] [phone]   [facility] [phone]   [facility] [phone]   [facility] [phone]   [facility] [phone]

## 2024-12-13 NOTE — ASSESSMENT & PLAN NOTE
Elevated blood pressure at 193/127 on admission likely secondary to pain.  Continue with lisinopril 20 mg daily.

## 2024-12-13 NOTE — ASSESSMENT & PLAN NOTE
Patient presents with 1 week history of intractable low back pain.  Denies any injury but was hunting and maybe pulled some muscle  Denies any radiation to the leg.  Denies any lower extremity weakness, paresthesias, saddle anesthesia, constipation, urinary  symptoms  Underwent CT abdomen pelvis in the emergency room.  Per ED physician preliminary read  negative for any spinal fractures  Will initiate multimodal pain regimen with scheduled Tylenol, muscle relaxant, gabapentin, Lidoderm patch, as needed oxycodone.  PT OT evaluation will be obtained  If pain continues to be poorly controlled, may need further imaging including MRI of the spine.

## 2024-12-13 NOTE — ASSESSMENT & PLAN NOTE
Initial Anesthesia Post-op Note    Patient: Francisco Bauer  Procedure(s) Performed: RIGHT KNEE ARTHROSCOPY WITH PARTIAL MEDIAL AND LATERAL MENISCECTOMY,  WITH DEBRIDEMENT - RIGHT  Anesthesia type: General    Vitals Value Taken Time   Temp  09/27/22 0826   Pulse 66 09/27/22 0820   Resp 10 09/27/22 0820   SpO2 91 % 09/27/22 0820   /75 09/27/22 0820         Patient Location: PACU Phase 1  Post-op Vital Signs:stable  Level of Consciousness: participates in exam, alert, awake and follows commands  Respiratory Status: spontaneous ventilation, unassisted and room air  Cardiovascular blood pressure returned to baseline and stable  Hydration: euvolemic  Pain Management: well controlled  Handoff: Handoff to receiving nurse was performed and questions were answered  Nausea: None  Airway Patency:patent  Post-op Assessment: awake, alert, appropriately conversant, or baseline, no complications, patient tolerated procedure well with no complications, no evidence of recall, regional anesthetic in place - able to participate, dentition within defined limits, moving all extremities and No Corneal Abrasion  Comments: Extubated awake in OR to PACU, stable, no complications, report given.Vitals signs noted and stable. Handoff completed with PACU RN. No anesthetic complications noted.      No complications documented.   Patient presents with 1 week history of intractable low back pain.  Denies any injury but was hunting and maybe pulled some muscle  Denies any radiation to the leg.  Denies any lower extremity weakness, paresthesias, saddle anesthesia, constipation, urinary  symptoms  Underwent CT abdomen pelvis in the emergency room.- negative for any abnormalities   Will initiate multimodal pain regimen with scheduled Tylenol, muscle relaxant, gabapentin, Lidoderm patch, as needed oxycodone.  Pain improved has been walking around - dc home . Will give prn robaxin and ibuprofen if needed otherwise no narcs have been utilized while in hospital

## 2024-12-13 NOTE — PLAN OF CARE
Problem: PAIN - ADULT  Goal: Verbalizes/displays adequate comfort level or baseline comfort level  Description: Interventions:  - Encourage patient to monitor pain and request assistance  - Assess pain using appropriate pain scale  - Administer analgesics based on type and severity of pain and evaluate response  - Implement non-pharmacological measures as appropriate and evaluate response  - Consider cultural and social influences on pain and pain management  - Notify physician/advanced practitioner if interventions unsuccessful or patient reports new pain  12/13/2024 1014 by Dana Blue RN  Outcome: Adequate for Discharge  12/13/2024 0915 by Dana Blue RN  Outcome: Progressing     Problem: SAFETY ADULT  Goal: Patient will remain free of falls  Description: INTERVENTIONS:  - Educate patient/family on patient safety including physical limitations  - Instruct patient to call for assistance with activity   - Consult OT/PT to assist with strengthening/mobility   - Keep Call bell within reach  - Keep bed low and locked with side rails adjusted as appropriate  - Keep care items and personal belongings within reach  - Initiate and maintain comfort rounds  - Make Fall Risk Sign visible to staff  - Offer Toileting every    Hours, in advance of need  - Initiate/Maintain   alarm  - Obtain necessary fall risk management equipment:     - Apply yellow socks and bracelet for high fall risk patients  - Consider moving patient to room near nurses station  12/13/2024 1014 by Dana Blue RN  Outcome: Adequate for Discharge  12/13/2024 0915 by Dana Blue RN  Outcome: Progressing  Goal: Maintain or return to baseline ADL function  Description: INTERVENTIONS:  -  Assess patient's ability to carry out ADLs; assess patient's baseline for ADL function and identify physical deficits which impact ability to perform ADLs (bathing, care of mouth/teeth, toileting, grooming, dressing, etc.)  - Assess/evaluate cause of  self-care deficits   - Assess range of motion  - Assess patient's mobility; develop plan if impaired  - Assess patient's need for assistive devices and provide as appropriate  - Encourage maximum independence but intervene and supervise when necessary  - Involve family in performance of ADLs  - Assess for home care needs following discharge   - Consider OT consult to assist with ADL evaluation and planning for discharge  - Provide patient education as appropriate  12/13/2024 1014 by Dana Blue RN  Outcome: Adequate for Discharge  12/13/2024 0915 by Dana Blue RN  Outcome: Progressing  Goal: Maintains/Returns to pre admission functional level  Description: INTERVENTIONS:  - Perform AM-PAC 6 Click Basic Mobility/ Daily Activity assessment daily.  - Set and communicate daily mobility goal to care team and patient/family/caregiver.   - Collaborate with rehabilitation services on mobility goals if consulted  - Perform Range of Motion    times a day.  - Reposition patient every    hours.  - Dangle patient    times a day  - Stand patient    times a day  - Ambulate patient    times a day  - Out of bed to chair    times a day   - Out of bed for meals          times a day  - Out of bed for toileting  - Record patient progress and toleration of activity level   12/13/2024 1014 by Dana Blue RN  Outcome: Adequate for Discharge  12/13/2024 0915 by Dana Blue RN  Outcome: Progressing     Problem: DISCHARGE PLANNING  Goal: Discharge to home or other facility with appropriate resources  Description: INTERVENTIONS:  - Identify barriers to discharge w/patient and caregiver  - Arrange for needed discharge resources and transportation as appropriate  - Identify discharge learning needs (meds, wound care, etc.)  - Arrange for interpretive services to assist at discharge as needed  - Refer to Case Management Department for coordinating discharge planning if the patient needs post-hospital services based on  physician/advanced practitioner order or complex needs related to functional status, cognitive ability, or social support system  12/13/2024 1014 by Dana Blue RN  Outcome: Adequate for Discharge  12/13/2024 0915 by Dana Blue RN  Outcome: Progressing     Problem: Knowledge Deficit  Goal: Patient/family/caregiver demonstrates understanding of disease process, treatment plan, medications, and discharge instructions  Description: Complete learning assessment and assess knowledge base.  Interventions:  - Provide teaching at level of understanding  - Provide teaching via preferred learning methods  12/13/2024 1014 by Dana Blue RN  Outcome: Adequate for Discharge  12/13/2024 0915 by Dana Blue RN  Outcome: Progressing

## 2024-12-13 NOTE — DISCHARGE SUMMARY
"Discharge Summary - Hospitalist   Name: Eladio Tafoya Jr. 54 y.o. male I MRN: [id number]  Unit/Bed#: -01 I Date of Admission: 12/12/2024   Date of Service: 12/13/2024 I Hospital Day: 0     Assessment & Plan  Intractable low back pain  Patient presents with 1 week history of intractable low back pain.  Denies any injury but was hunting and maybe pulled some muscle  Denies any radiation to the leg.  Denies any lower extremity weakness, paresthesias, saddle anesthesia, constipation, urinary  symptoms  Underwent CT abdomen pelvis in the emergency room.- negative for any abnormalities   Will initiate multimodal pain regimen with scheduled Tylenol, muscle relaxant, gabapentin, Lidoderm patch, as needed oxycodone.  Pain improved has been walking around - dc home . Will give prn robaxin and ibuprofen if needed otherwise no narcs have been utilized while in hospital  Hypertension  Bp stable Continue with lisinopril 20 mg daily.  Type 2 diabetes mellitus without complication, without long-term current use of insulin ([facility])  Lab Results   Component Value Date    HGBA1C 6.7 (H) 09/10/2024       No results for input(s): \"POCGLU\" in the last 72 hours.    Blood Sugar Average: Last 72 hrs:  Not on any medications.  Continue with serial blood glucose monitoring and sliding scale insulin.         Medical Problems        MESSAGE TO PCP (Jose Avila DO) FOR FOLLOW UP:   Thank you for allowing us to participate in the care of your patient, Eladio Tafoya Jr., who was hospitalized from 12/12/2024 through 12/13/24 with the admitting diagnosis of intractable back pain .      Medication Changes:  none  Outpatient testing recommended:  none  If you have any additional questions or would like to discuss further, please feel free to contact me.  Mónica Gaston MD  [facility] Internal Medicine, Hospitalist, [phone]     Admission Date:   Admission Orders (From admission, onward)       Ordered        12/12/24 2048  Place in " "Observation  Once                          Discharge Date: 12/13/24    Consultations During Hospital Stay:  none    Procedures Performed:   none    Significant Findings / Test Results:   Ct abdomen and pelvis- negative for any abnormality     Incidental Findings:   none    Test Results Pending at Discharge (will require follow up):   none     Complications:  none    Reason for Admission: intractable back pain     Hospital Course:   Eladio Tafoya Jr. is a 54 y.o. male patient who originally presented to the hospital on 12/12/2024 due to intractable low back pain was hunting and probably pulled muscle. Imaging negative was started on multimodal pain regimen and back pain improved and able to ambulate without issues and cleared for discharge.          Please see above list of diagnoses and related plan for additional information.     Condition at Discharge: stable    Discharge Day Visit / Exam:   Subjective:  seen and examined doing better . Back pain improved able to walk   Vitals: Blood Pressure: 149/84 (12/13/24 0717)  Pulse: 61 (12/13/24 0717)  Temperature: 97.7 °F (36.5 °C) (12/13/24 0717)  Temp Source: Oral (12/12/24 2209)  Respirations: 20 (12/12/24 2310)  Height: 5' 10\" (177.8 cm) (12/12/24 2209)  Weight - Scale: 115 kg (253 lb 9.6 oz) (12/12/24 2209)  SpO2: 96 % (12/13/24 0717)  Physical Exam  Vitals and nursing note reviewed.   Constitutional:       General: He is not in acute distress.     Appearance: He is well-developed.   HENT:      Head: Normocephalic and atraumatic.   Eyes:      Conjunctiva/sclera: Conjunctivae normal.   Cardiovascular:      Rate and Rhythm: Normal rate and regular rhythm.      Heart sounds: No murmur heard.  Pulmonary:      Effort: Pulmonary effort is normal. No respiratory distress.      Breath sounds: Normal breath sounds. No wheezing or rales.   Abdominal:      General: There is no distension.      Palpations: Abdomen is soft.      Tenderness: There is no abdominal tenderness. "   Musculoskeletal:         General: No swelling.      Cervical back: Neck supple.   Skin:     General: Skin is warm and dry.      Capillary Refill: Capillary refill takes less than 2 seconds.   Neurological:      Mental Status: He is alert and oriented to person, place, and time.   Psychiatric:         Mood and Affect: Mood normal.          Discussion with Family: Patient declined call to .     Discharge instructions/Information to patient and family:   See after visit summary for information provided to patient and family.      Provisions for Follow-Up Care:  See after visit summary for information related to follow-up care and any pertinent home health orders.      Mobility at time of Discharge:   Basic Mobility Inpatient Raw Score: 24  JH-HLM Goal: 8: Walk 250 feet or more  JH-HLM Achieved: 7: Walk 25 feet or more  HLM Goal achieved. Continue to encourage appropriate mobility.     Disposition:   Home    Planned Readmission: no    Discharge Medications:  See after visit summary for reconciled discharge medications provided to patient and/or family.      Administrative Statements   Discharge Statement:  I have spent a total time of >35 minutes in caring for this patient on the day of the visit/encounter. >30 minutes of time was spent on: Diagnostic results, Documenting in the medical record, and Reviewing / ordering tests, medicine, procedures  .    **Please Note: This note may have been constructed using a voice recognition system**

## 2024-12-13 NOTE — PHYSICAL THERAPY NOTE
PHYSICAL THERAPY NOTE          Patient Name: Eladio Tafoya Jr.  Today's Date: 12/13/2024    Attempted to see patient this AM however patient discharged prior to evaluation.    Mariah Little, PT,DPT

## 2024-12-13 NOTE — PLAN OF CARE
Problem: PAIN - ADULT  Goal: Verbalizes/displays adequate comfort level or baseline comfort level  Description: Interventions:  - Encourage patient to monitor pain and request assistance  - Assess pain using appropriate pain scale  - Administer analgesics based on type and severity of pain and evaluate response  - Implement non-pharmacological measures as appropriate and evaluate response  - Consider cultural and social influences on pain and pain management  - Notify physician/advanced practitioner if interventions unsuccessful or patient reports new pain  Outcome: Progressing     Problem: SAFETY ADULT  Goal: Patient will remain free of falls  Description: INTERVENTIONS:  - Educate patient/family on patient safety including physical limitations  - Instruct patient to call for assistance with activity   - Consult OT/PT to assist with strengthening/mobility   - Keep Call bell within reach  - Keep bed low and locked with side rails adjusted as appropriate  - Keep care items and personal belongings within reach  - Initiate and maintain comfort rounds  - Make Fall Risk Sign visible to staff  - Offer Toileting every 2 Hours, in advance of need  - Initiate/Maintain bed alarm  - Obtain necessary fall risk management equipment:    - Apply yellow socks and bracelet for high fall risk patients  - Consider moving patient to room near nurses station  Outcome: Progressing  Goal: Maintain or return to baseline ADL function  Description: INTERVENTIONS:  -  Assess patient's ability to carry out ADLs; assess patient's baseline for ADL function and identify physical deficits which impact ability to perform ADLs (bathing, care of mouth/teeth, toileting, grooming, dressing, etc.)  - Assess/evaluate cause of self-care deficits   - Assess range of motion  - Assess patient's mobility; develop plan if impaired  - Assess patient's need for assistive devices and provide as appropriate  - Encourage maximum independence but intervene and  supervise when necessary  - Involve family in performance of ADLs  - Assess for home care needs following discharge   - Consider OT consult to assist with ADL evaluation and planning for discharge  - Provide patient education as appropriate  Outcome: Progressing  Goal: Maintains/Returns to pre admission functional level  Description: INTERVENTIONS:  - Perform AM-PAC 6 Click Basic Mobility/ Daily Activity assessment daily.  - Set and communicate daily mobility goal to care team and patient/family/caregiver.   - Collaborate with rehabilitation services on mobility goals if consulted  - Perform Range of Motion   times a day.  - Reposition patient every  hours.  - Dangle patient   times a day  - Stand patient   times a day  - Ambulate patient   times a day  - Out of bed to chair   times a day   - Out of bed for meals   times a day  - Out of bed for toileting  - Record patient progress and toleration of activity level   Outcome: Progressing     Problem: DISCHARGE PLANNING  Goal: Discharge to home or other facility with appropriate resources  Description: INTERVENTIONS:  - Identify barriers to discharge w/patient and caregiver  - Arrange for needed discharge resources and transportation as appropriate  - Identify discharge learning needs (meds, wound care, etc.)  - Arrange for interpretive services to assist at discharge as needed  - Refer to Case Management Department for coordinating discharge planning if the patient needs post-hospital services based on physician/advanced practitioner order or complex needs related to functional status, cognitive ability, or social support system  Outcome: Progressing     Problem: Knowledge Deficit  Goal: Patient/family/caregiver demonstrates understanding of disease process, treatment plan, medications, and discharge instructions  Description: Complete learning assessment and assess knowledge base.  Interventions:  - Provide teaching at level of understanding  - Provide teaching via  preferred learning methods  Outcome: Progressing

## 2025-02-12 ENCOUNTER — APPOINTMENT (EMERGENCY)
Dept: RADIOLOGY | Facility: HOSPITAL | Age: 55
End: 2025-02-12
Attending: EMERGENCY MEDICINE
Payer: COMMERCIAL

## 2025-02-12 ENCOUNTER — HOSPITAL ENCOUNTER (EMERGENCY)
Facility: HOSPITAL | Age: 55
Discharge: HOME | End: 2025-02-12
Attending: EMERGENCY MEDICINE
Payer: COMMERCIAL

## 2025-02-12 VITALS
SYSTOLIC BLOOD PRESSURE: 174 MMHG | OXYGEN SATURATION: 97 % | RESPIRATION RATE: 18 BRPM | DIASTOLIC BLOOD PRESSURE: 99 MMHG | HEART RATE: 71 BPM | TEMPERATURE: 98 F

## 2025-02-12 DIAGNOSIS — S43.409A SHOULDER SPRAIN: Primary | ICD-10-CM

## 2025-02-12 PROCEDURE — 99284 EMERGENCY DEPT VISIT MOD MDM: CPT | Performed by: EMERGENCY MEDICINE

## 2025-02-12 PROCEDURE — 73030 X-RAY EXAM OF SHOULDER: CPT

## 2025-02-12 PROCEDURE — 99283 EMERGENCY DEPT VISIT LOW MDM: CPT

## 2025-02-12 NOTE — ED PROVIDER NOTES
ED Disposition       None          Assessment & Plan   {Hyperlinks  Risk Stratification - NIHSS - HEART SCORE - Fill out sepsis note and make sure you call [id number] if severe or septic shock:[id number]}    Medical Decision Making  1721:  Pt appears well, VS reviewed.  Concerns for right shoulder strain/sprain.  Plan to complete x-rays to rule out any bony injury.  Offered analgesics for his discomfort, pt declined.      Amount and/or Complexity of Data Reviewed  Radiology: ordered.     Details: Right shoulder xrays--             Medications - No data to display    ED Risk Strat Scores                          SBIRT 22yo+      Flowsheet Row Most Recent Value   Initial Alcohol Screen: US AUDIT-C     1. How often do you have a drink containing alcohol? 4 Filed at: 02/12/2025 1719   2. How many drinks containing alcohol do you have on a typical day you are drinking?  1 Filed at: 02/12/2025 1719   3a. Male UNDER 65: How often do you have five or more drinks on one occasion? 0 Filed at: 02/12/2025 1719   Audit-C Score 5 Filed at: 02/12/2025 1719   DUSTIN: How many times in the past year have you...    Used an illegal drug or used a prescription medication for non-medical reasons? Never Filed at: 02/12/2025 1719                            History of Present Illness   {Hyperlinks  History (Med, Surg, Fam, Social) - Current Medications - Allergies  :[id number]}    Chief Complaint   Patient presents with    Shoulder Pain     Patient fell on ice around 1530 yesterday landing on R shoulder. Today c/o limited movement and pain in R shoulder. -HS, -LOC, -BT       Past Medical History:   Diagnosis Date    Hypertension       History reviewed. No pertinent surgical history.   History reviewed. No pertinent family history.   Social History     Tobacco Use    Smoking status: Never    Smokeless tobacco: Never   Vaping Use    Vaping status: Never Used   Substance Use Topics    Alcohol use: Yes    Drug use: Not Currently       E-Cigarette/Vaping    E-Cigarette Use Never User       E-Cigarette/Vaping Substances      I have reviewed and agree with the history as documented.       History provided by:  Patient and medical records  Shoulder Pain  Location:  Shoulder  Shoulder location:  R shoulder  Injury: yes    Time since incident:  1 day  Mechanism of injury: fall    Mechanism of injury comment:  Slipped on ice yesterday, fell with his right arm outstretched  Fall:     Fall occurred:  Tripped    Height of fall:  GLF    Impact surface:  [place]    Point of impact:  Outstretched arms    Entrapped after fall: no    Pain details:     Quality:  Aching and dull    Radiates to:  Does not radiate    Severity:  Moderate    Onset quality:  Sudden    Duration:  1 day    Timing:  Constant    Progression:  Unchanged  Prior injury to area:  No  Relieved by:  Rest  Worsened by:  Movement  Ineffective treatments:  None tried  Associated symptoms: no back pain, no decreased range of motion, no fatigue, no fever, no muscle weakness, no neck pain, no numbness, no stiffness, no swelling and no tingling        Review of Systems   Constitutional:  Negative for appetite change, chills, fatigue and fever.   HENT:  Negative for ear pain, rhinorrhea, sore throat and trouble swallowing.    Eyes:  Negative for pain, discharge and visual disturbance.   Respiratory:  Negative for cough, chest tightness and shortness of breath.    Cardiovascular:  Negative for chest pain and palpitations.   Gastrointestinal:  Negative for abdominal pain, nausea and vomiting.   Endocrine: Negative for polydipsia, polyphagia and polyuria.   Genitourinary:  Negative for difficulty urinating, dysuria, hematuria and testicular pain.   Musculoskeletal:  Positive for arthralgias. Negative for back pain, neck pain and stiffness.   Skin:  Negative for color change and rash.   Allergic/Immunologic: Negative for immunocompromised state.   Neurological:  Negative for dizziness, seizures,  syncope, weakness and headaches.   Hematological:  Negative for adenopathy.   Psychiatric/Behavioral:  Negative for confusion and dysphoric mood.    All other systems reviewed and are negative.          Objective   {Hyperlinks  Historical Vitals - Historical Labs - Chart Review/Microbiology - Last Echo - Code Status  :[id number]}    ED Triage Vitals [02/12/25 1719]   Temperature Pulse Blood Pressure Respirations SpO2 Patient Position - Orthostatic VS   98 °F (36.7 °C) 73 (!) 199/112 16 97 % Sitting      Temp Source Heart Rate Source BP Location FiO2 (%) Pain Score    Temporal Monitor Left arm -- --      Vitals      Date and Time Temp Pulse SpO2 Resp BP Pain Score FACES Pain Rating User   02/12/25 1719 98 °F (36.7 °C) 73 97 % 16 199/112 -- -- SL            Physical Exam  Vitals and nursing note reviewed.   Constitutional:       General: He is not in acute distress.     Appearance: Normal appearance. He is not ill-appearing, toxic-appearing or diaphoretic.   HENT:      Head: Normocephalic and atraumatic.      Right Ear: External ear normal.      Left Ear: External ear normal.      Nose: Nose normal. No congestion or rhinorrhea.      Mouth/Throat:      Mouth: Mucous membranes are moist.      Pharynx: Oropharynx is clear. No oropharyngeal exudate or posterior oropharyngeal erythema.   Eyes:      General:         Right eye: No discharge.         Left eye: No discharge.   Cardiovascular:      Rate and Rhythm: Normal rate and regular rhythm.      Pulses: Normal pulses.      Heart sounds: Normal heart sounds. No murmur heard.     No gallop.   Pulmonary:      Effort: Pulmonary effort is normal. No respiratory distress.   Musculoskeletal:         General: Tenderness present. No swelling or deformity.      Cervical back: Normal range of motion and neck supple.      Comments: Mild tenderness to palpation of the right anterior and posterior right shoulder.  Decreased range of motion due to pain.   Skin:     General: Skin is  warm and dry.      Capillary Refill: Capillary refill takes less than 2 seconds.   Neurological:      General: No focal deficit present.      Mental Status: He is alert and oriented to person, place, and time.      Cranial Nerves: No cranial nerve deficit.      Sensory: No sensory deficit.      Motor: No weakness.      Coordination: Coordination normal.      Gait: Gait normal.      Deep Tendon Reflexes: Reflexes normal.   Psychiatric:         Mood and Affect: Mood normal.         Behavior: Behavior normal.         Thought Content: Thought content normal.         Judgment: Judgment normal.         Results Reviewed       None            No orders to display       Procedures    ED Medication and Procedure Management   Prior to Admission Medications   Prescriptions Last Dose Informant Patient Reported? Taking?   ibuprofen (MOTRIN) 600 mg tablet   No No   Sig: Take 1 tablet (600 mg total) by mouth every 8 (eight) hours as needed for mild pain   lisinopril (ZESTRIL) 20 mg tablet  Self Yes No   Sig: Take 20 mg by mouth daily   methocarbamol (ROBAXIN) 500 mg tablet   No No   Sig: Take 1 tablet (500 mg total) by mouth every 6 (six) hours as needed for muscle spasms      Facility-Administered Medications: None     Patient's Medications   Discharge Prescriptions    No medications on file     No discharge procedures on file.  ED SEPSIS DOCUMENTATION            methocarbamol (ROBAXIN) 500 mg tablet Take 1 tablet (500 mg total) by mouth every 6 (six) hours as needed for muscle spasms, Starting Fri 12/13/2024, Normal           No discharge procedures on file.  ED SEPSIS DOCUMENTATION   Time reflects when diagnosis was documented in both MDM as applicable and the Disposition within this note       Time User Action Codes Description Comment    2/12/2025  5:47 PM Jimmy Dao Add [S43.409A] Shoulder sprain                  Jimmy Dao MD  02/18/25 0608

## 2025-02-12 NOTE — Clinical Note
Eladio Tafoya was seen and treated in our emergency department on 2/12/2025.        No work until cleared by Family Doctor/Orthopedics        Diagnosis:     Eladio  .    He may return on this date:          If you have any questions or concerns, please don't hesitate to call.      Jimmy Dao MD    ______________________________           _______________          _______________  Hospital Representative                              Date                                Time

## 2025-06-30 ENCOUNTER — HOSPITAL ENCOUNTER (EMERGENCY)
Facility: HOSPITAL | Age: 55
Discharge: HOME | End: 2025-06-30
Attending: EMERGENCY MEDICINE
Payer: COMMERCIAL

## 2025-06-30 VITALS
DIASTOLIC BLOOD PRESSURE: 76 MMHG | TEMPERATURE: 97.7 F | SYSTOLIC BLOOD PRESSURE: 156 MMHG | OXYGEN SATURATION: 96 % | HEART RATE: 74 BPM | RESPIRATION RATE: 16 BRPM

## 2025-06-30 DIAGNOSIS — K62.5 RECTAL BLEEDING: Primary | ICD-10-CM

## 2025-06-30 DIAGNOSIS — K64.4 EXTERNAL HEMORRHOID, BLEEDING: ICD-10-CM

## 2025-06-30 DIAGNOSIS — I10 HYPERTENSION, UNSPECIFIED TYPE: ICD-10-CM

## 2025-06-30 DIAGNOSIS — R73.9 HYPERGLYCEMIA: ICD-10-CM

## 2025-06-30 LAB
ALBUMIN SERPL BCG-MCNC: 4.6 G/DL (ref 3.5–5)
ALP SERPL-CCNC: 63 U/L (ref 34–104)
ALT SERPL W P-5'-P-CCNC: 26 U/L (ref 7–52)
ANION GAP SERPL CALCULATED.3IONS-SCNC: 8 MMOL/L (ref 4–13)
APTT PPP: 29 SECONDS (ref 23–34)
AST SERPL W P-5'-P-CCNC: 24 U/L (ref 13–39)
BASOPHILS # BLD AUTO: 0.03 THOUSANDS/ÂΜL (ref 0–0.1)
BASOPHILS NFR BLD AUTO: 0 % (ref 0–1)
BILIRUB SERPL-MCNC: 0.68 MG/DL (ref 0.2–1)
BUN SERPL-MCNC: 10 MG/DL (ref 5–25)
CALCIUM SERPL-MCNC: 9.7 MG/DL (ref 8.4–10.2)
CHLORIDE SERPL-SCNC: 103 MMOL/L (ref 96–108)
CO2 SERPL-SCNC: 27 MMOL/L (ref 21–32)
CREAT SERPL-MCNC: 0.92 MG/DL (ref 0.6–1.3)
EOSINOPHIL # BLD AUTO: 0.08 THOUSAND/ÂΜL (ref 0–0.61)
EOSINOPHIL NFR BLD AUTO: 1 % (ref 0–6)
ERYTHROCYTE [DISTWIDTH] IN BLOOD BY AUTOMATED COUNT: 11.9 % (ref 11.6–15.1)
GFR SERPL CREATININE-BSD FRML MDRD: 94 ML/MIN/1.73SQ M
GLUCOSE SERPL-MCNC: 151 MG/DL (ref 65–140)
HCT VFR BLD AUTO: 46 % (ref 36.5–49.3)
HGB BLD-MCNC: 15.8 G/DL (ref 12–17)
IMM GRANULOCYTES # BLD AUTO: 0.03 THOUSAND/UL (ref 0–0.2)
IMM GRANULOCYTES NFR BLD AUTO: 0 % (ref 0–2)
INR PPP: 0.98 (ref 0.85–1.19)
LIPASE SERPL-CCNC: 26 U/L (ref 11–82)
LYMPHOCYTES # BLD AUTO: 0.8 THOUSANDS/ÂΜL (ref 0.6–4.47)
LYMPHOCYTES NFR BLD AUTO: 11 % (ref 14–44)
MCH RBC QN AUTO: 32.3 PG (ref 26.8–34.3)
MCHC RBC AUTO-ENTMCNC: 34.3 G/DL (ref 31.4–37.4)
MCV RBC AUTO: 94 FL (ref 82–98)
MONOCYTES # BLD AUTO: 0.56 THOUSAND/ÂΜL (ref 0.17–1.22)
MONOCYTES NFR BLD AUTO: 8 % (ref 4–12)
NEUTROPHILS # BLD AUTO: 5.5 THOUSANDS/ÂΜL (ref 1.85–7.62)
NEUTS SEG NFR BLD AUTO: 80 % (ref 43–75)
NRBC BLD AUTO-RTO: 0 /100 WBCS
PLATELET # BLD AUTO: 263 THOUSANDS/UL (ref 149–390)
PMV BLD AUTO: 10.1 FL (ref 8.9–12.7)
POTASSIUM SERPL-SCNC: 4.9 MMOL/L (ref 3.5–5.3)
PROT SERPL-MCNC: 7.5 G/DL (ref 6.4–8.4)
PROTHROMBIN TIME: 13.4 SECONDS (ref 12.3–15)
RBC # BLD AUTO: 4.89 MILLION/UL (ref 3.88–5.62)
SODIUM SERPL-SCNC: 138 MMOL/L (ref 135–147)
WBC # BLD AUTO: 7 THOUSAND/UL (ref 4.31–10.16)

## 2025-06-30 PROCEDURE — 99284 EMERGENCY DEPT VISIT MOD MDM: CPT

## 2025-06-30 PROCEDURE — 80053 COMPREHEN METABOLIC PANEL: CPT | Performed by: EMERGENCY MEDICINE

## 2025-06-30 PROCEDURE — 85730 THROMBOPLASTIN TIME PARTIAL: CPT | Performed by: EMERGENCY MEDICINE

## 2025-06-30 PROCEDURE — 36415 COLL VENOUS BLD VENIPUNCTURE: CPT | Performed by: EMERGENCY MEDICINE

## 2025-06-30 PROCEDURE — 83690 ASSAY OF LIPASE: CPT | Performed by: EMERGENCY MEDICINE

## 2025-06-30 PROCEDURE — 99284 EMERGENCY DEPT VISIT MOD MDM: CPT | Performed by: EMERGENCY MEDICINE

## 2025-06-30 PROCEDURE — 85610 PROTHROMBIN TIME: CPT | Performed by: EMERGENCY MEDICINE

## 2025-06-30 PROCEDURE — 85025 COMPLETE CBC W/AUTO DIFF WBC: CPT | Performed by: EMERGENCY MEDICINE

## 2025-06-30 RX ORDER — DOCUSATE SODIUM 100 MG/1
100 CAPSULE, LIQUID FILLED ORAL 2 TIMES DAILY
Qty: 30 CAPSULE | Refills: 0 | Status: SHIPPED | OUTPATIENT
Start: 2025-06-30

## 2025-06-30 NOTE — DISCHARGE INSTRUCTIONS
Take stool softener as needed to avoid any straining.  Follow-up with primary care regarding high blood pressure, high blood sugar noted incidentally in ED today.  Follow-up with gastroenterology for recheck of symptoms, consideration of colonoscopy.  Return to the ED for any new or worsening symptoms or concerns.

## 2025-06-30 NOTE — ED PROVIDER NOTES
Time reflects when diagnosis was documented in both MDM as applicable and the Disposition within this note       Time User Action Codes Description Comment    6/30/2025 10:07 AM Benson Vieira [K62.5] Rectal bleeding     6/30/2025 10:07 AM Benson Vieira [K64.4] External hemorrhoid, bleeding     6/30/2025 10:07 AM Benson Vieira [I10] Hypertension, unspecified type     6/30/2025 10:08 AM Benson Vieira [R73.9] Hyperglycemia           ED Disposition       ED Disposition   Discharge    Condition   Stable    Date/Time   Mon Jun 30, 2025 10:07 AM    Comment   Eladio Tafoya Jr. discharge to home/self care.                   Assessment & Plan       Medical Decision Making  Patient was seen and evaluated for his presentation as outlined.  At risk for hemorrhoidal bleeding, either internal or external hemorrhoid, intra-abdominal mass, infection, anemia, clotting disorder, other.  Workup as shown.  Benign abdominal exam, no reported pain.  Doubt significant intra-abdominal pathology.  Likely source of bleeding identified on rectal exam, demonstrating signs of bleeding external hemorrhoid.  Laboratory studies within normal limits, slight hyperglycemia without evidence of DKA.  Hypertensive on arrival, improved during ED course.  Advise follow-up regarding hypertension and hyperglycemia.  Will provide gastroenterology referral.  Stool softeners prescribed.  Patient denies any straining.  Patient is stable for discharge from the emergency department.  Return precautions reviewed.  All questions answered.    Amount and/or Complexity of Data Reviewed  Labs: ordered. Decision-making details documented in ED Course.        ED Course as of 06/30/25 1011   Mon Jun 30, 2025   0917 Hemoglobin: 15.8   0917 Hematocrit: 46.0   0935 PTT: 29   0936 PROTIME: 13.4   0936 POCT INR: 0.98   0936 Platelet Count: 263       Medications - No data to display    ED Risk Strat Scores                    No data recorded                             History of Present Illness       Chief Complaint   Patient presents with    Rectal Bleeding     Pt reports rectal bleeding since yesterday; states he thinks it may be from a hemorrhoid       Past Medical History[1]   Past Surgical History[2]   Family History[3]   Social History[4]   E-Cigarette/Vaping    E-Cigarette Use Never User       E-Cigarette/Vaping Substances      I have reviewed and agree with the history as documented.     Patient presents to the emergency department for evaluation of rectal bleeding that started around 2:00 this morning.  Patient reports passing bright red blood, soaking 2 sets of underwear.  Thinks that the bleeding has slowed down.  Does have a history of known hemorrhoids, could feel a hemorrhoid earlier, thinks that is where the bleeding is coming from.  States the stool was normal, last bowel movement was this morning.  Denies abdominal pain, nausea, vomiting, fever, or chills.  No prior episodes of rectal bleeding.  No blood thinners.  States he was drinking alcohol yesterday, thinks that may have contributed.  Does take medication for blood pressure, noted to be mildly elevated on arrival, thinks it might be from his anxiety regarding his complaint.  He denies any headache, chest pain, shortness of breath, lightheadedness, dizziness, or visual changes.  Has never had a colonoscopy.  No other complaints, modifying factors, or associated symptoms.        Review of Systems   All other systems reviewed and are negative.          Objective       ED Triage Vitals [06/30/25 0851]   Temperature Pulse Blood Pressure Respirations SpO2 Patient Position - Orthostatic VS   97.7 °F (36.5 °C) (!) 116 (!) 209/106 16 98 % Sitting      Temp Source Heart Rate Source BP Location FiO2 (%) Pain Score    Temporal Monitor Left arm -- No Pain      Vitals      Date and Time Temp Pulse SpO2 Resp BP Pain Score FACES Pain Rating User   06/30/25 0945 -- 77 95 % -- -- -- -- MY   06/30/25 0930 -- 78 94 %  -- 151/73 -- --    06/30/25 0915 -- 83 95 % -- 159/83 -- --    06/30/25 0851 97.7 °F (36.5 °C) 116 98 % 16 209/106 No Pain -- AS            Physical Exam  Vitals and nursing note reviewed.   Constitutional:       General: He is not in acute distress.     Appearance: Normal appearance. He is well-developed. He is not ill-appearing.   HENT:      Head: Normocephalic and atraumatic.      Mouth/Throat:      Mouth: Mucous membranes are moist.     Eyes:      Extraocular Movements: Extraocular movements intact.      Conjunctiva/sclera: Conjunctivae normal.      Pupils: Pupils are equal, round, and reactive to light.       Cardiovascular:      Rate and Rhythm: Normal rate and regular rhythm.      Pulses: Normal pulses.      Heart sounds: Normal heart sounds. No murmur heard.     No friction rub. No gallop.   Pulmonary:      Effort: Pulmonary effort is normal. No respiratory distress.      Breath sounds: Normal breath sounds. No wheezing, rhonchi or rales.   Abdominal:      General: There is no distension.      Palpations: Abdomen is soft.      Tenderness: There is no abdominal tenderness. There is no guarding or rebound.   Genitourinary:     Comments: External hemorrhoid noted, nonthrombosed, present, consistent with source of bleeding.  Small amount of oozing still present.  Residual gross blood present.  Normal rectal tone.    Musculoskeletal:         General: No swelling.      Cervical back: Neck supple.     Skin:     General: Skin is warm and dry.      Capillary Refill: Capillary refill takes less than 2 seconds.     Neurological:      General: No focal deficit present.      Mental Status: He is alert and oriented to person, place, and time.     Psychiatric:         Mood and Affect: Mood normal.         Behavior: Behavior normal.         Results Reviewed       Procedure Component Value Units Date/Time    Comprehensive metabolic panel [[id number]]  (Abnormal) Collected: 06/30/25 0909    Lab Status: Final result  Specimen: Blood from Arm, Right Updated: 06/30/25 0936     Sodium 138 mmol/L      Potassium 4.9 mmol/L      Chloride 103 mmol/L      CO2 27 mmol/L      ANION GAP 8 mmol/L      BUN 10 mg/dL      Creatinine 0.92 mg/dL      Glucose 151 mg/dL      Calcium 9.7 mg/dL      AST 24 U/L      ALT 26 U/L      Alkaline Phosphatase 63 U/L      Total Protein 7.5 g/dL      Albumin 4.6 g/dL      Total Bilirubin 0.68 mg/dL      eGFR 94 ml/min/1.73sq m     Narrative:      [employer] guidelines for Chronic Kidney Disease (CKD):     Stage 1 with normal or high GFR (GFR > 90 mL/min/1.73 square meters)    Stage 2 Mild CKD (GFR = 60-89 mL/min/1.73 square meters)    Stage 3A Moderate CKD (GFR = 45-59 mL/min/1.73 square meters)    Stage 3B Moderate CKD (GFR = 30-44 mL/min/1.73 square meters)    Stage 4 Severe CKD (GFR = 15-29 mL/min/1.73 square meters)    Stage 5 End Stage CKD (GFR <15 mL/min/1.73 square meters)  Note: GFR calculation is accurate only with a steady state creatinine    Lipase [[id number]]  (Normal) Collected: 06/30/25 0909    Lab Status: Final result Specimen: Blood from Arm, Right Updated: 06/30/25 0936     Lipase 26 u/L     Protime-INR [[id number]]  (Normal) Collected: 06/30/25 0909    Lab Status: Final result Specimen: Blood from Arm, Right Updated: 06/30/25 0929     Protime 13.4 seconds      INR 0.98    Narrative:      INR Therapeutic Range    Indication                                             INR Range      Atrial Fibrillation                                               2.0-3.0  Hypercoagulable State                                    2.0.2.3  Left Ventricular Asist Device                            2.0-3.0  Mechanical Heart Valve                                  -    Aortic(with afib, MI, embolism, HF, LA enlargement,    and/or coagulopathy)                                     2.0-3.0 (2.5-3.5)     Mitral                                                              2.5-3.5  Prosthetic/Bioprosthetic Heart Valve               2.0-3.0  Venous thromboembolism (VTE: VT, PE        2.0-3.0    APTT [[id number]]  (Normal) Collected: 06/30/25 0909    Lab Status: Final result Specimen: Blood from Arm, Right Updated: 06/30/25 0929     PTT 29 seconds     CBC and differential [[id number]]  (Abnormal) Collected: 06/30/25 0909    Lab Status: Final result Specimen: Blood from Arm, Right Updated: 06/30/25 0914     WBC 7.00 Thousand/uL      RBC 4.89 Million/uL      Hemoglobin 15.8 g/dL      Hematocrit 46.0 %      MCV 94 fL      MCH 32.3 pg      MCHC 34.3 g/dL      RDW 11.9 %      MPV 10.1 fL      Platelets 263 Thousands/uL      nRBC 0 /100 WBCs      Segmented % 80 %      Immature Grans % 0 %      Lymphocytes % 11 %      Monocytes % 8 %      Eosinophils Relative 1 %      Basophils Relative 0 %      Absolute Neutrophils 5.50 Thousands/µL      Absolute Immature Grans 0.03 Thousand/uL      Absolute Lymphocytes 0.80 Thousands/µL      Absolute Monocytes 0.56 Thousand/µL      Eosinophils Absolute 0.08 Thousand/µL      Basophils Absolute 0.03 Thousands/µL             No orders to display       Procedures    ED Medication and Procedure Management   Prior to Admission Medications   Prescriptions Last Dose Informant Patient Reported? Taking?   ibuprofen (MOTRIN) 600 mg tablet   No No   Sig: Take 1 tablet (600 mg total) by mouth every 8 (eight) hours as needed for mild pain   lisinopril (ZESTRIL) 20 mg tablet  Self Yes No   Sig: Take 20 mg by mouth daily   methocarbamol (ROBAXIN) 500 mg tablet   No No   Sig: Take 1 tablet (500 mg total) by mouth every 6 (six) hours as needed for muscle spasms      Facility-Administered Medications: None     Patient's Medications   Discharge Prescriptions    DOCUSATE SODIUM (COLACE) 100 MG CAPSULE    Take 1 capsule (100 mg total) by mouth 2 (two) times a day       Start Date: 6/30/2025 End Date: --       Order Dose: 100 mg       Quantity: 30 capsule    Refills: 0       ED  SEPSIS DOCUMENTATION   Time reflects when diagnosis was documented in both MDM as applicable and the Disposition within this note       Time User Action Codes Description Comment    6/30/2025 10:07 AM Benson Vieira [K62.5] Rectal bleeding     6/30/2025 10:07 AM Benson Vieira [K64.4] External hemorrhoid, bleeding     6/30/2025 10:07 AM Benson Vieira [I10] Hypertension, unspecified type     6/30/2025 10:08 AM Benson Vieira [R73.9] Hyperglycemia                      [1]   Past Medical History:  Diagnosis Date    Hypertension    [2]   Past Surgical History:  Procedure Laterality Date    ROTATOR CUFF REPAIR Right 04/2025   [3] No family history on file.  [4]   Social History  Tobacco Use    Smoking status: Never    Smokeless tobacco: Never   Vaping Use    Vaping status: Never Used   Substance Use Topics    Alcohol use: Yes    Drug use: Not Currently        Benson Vieira MD  06/30/25 1011

## 2025-07-30 NOTE — H&P
"H&P - Hospitalist   Name: Eladio Tafoya Jr. 54 y.o. male I MRN: [id number]  Unit/Bed#: TR 13B I Date of Admission: 12/12/2024   Date of Service: 12/12/2024 I Hospital Day: 0     Assessment & Plan  Intractable low back pain  Patient presents with 1 week history of intractable low back pain.  Denies any injury but was hunting and maybe pulled some muscle  Denies any radiation to the leg.  Denies any lower extremity weakness, paresthesias, saddle anesthesia, constipation, urinary  symptoms  Underwent CT abdomen pelvis in the emergency room.  Per ED physician preliminary read  negative for any spinal fractures  Will initiate multimodal pain regimen with scheduled Tylenol, muscle relaxant, gabapentin, Lidoderm patch, as needed oxycodone.  PT OT evaluation will be obtained  If pain continues to be poorly controlled, may need further imaging including MRI of the spine.  Hypertension  Elevated blood pressure at 193/127 on admission likely secondary to pain.  Continue with lisinopril 20 mg daily.  Type 2 diabetes mellitus without complication, without long-term current use of insulin ([facility])  Lab Results   Component Value Date    HGBA1C 6.7 (H) 09/10/2024       No results for input(s): \"POCGLU\" in the last 72 hours.    Blood Sugar Average: Last 72 hrs:  Not on any medications.  Continue with serial blood glucose monitoring and sliding scale insulin.        VTE Pharmacologic Prophylaxis:   Moderate Risk (Score 3-4) - Pharmacological DVT Prophylaxis Ordered: enoxaparin (Lovenox).  Code Status: No Order full code  Discussion with family:   Anticipated Length of Stay: Patient will be admitted on an observation basis with an anticipated length of stay of less than 2 midnights secondary to management as documented above.    History of Present Illness   Chief Complaint: Back pain    Eladio Tafoya Jr. is a 54 y.o. male with a PMH of hypertension who presents with back pain worse for the last 1 day.  Patient states that he has been out " PAST SURGICAL HISTORY:  No significant past surgical history      hunting last week and was walking a lot outdoors.  Even tripped and fell a couple of times on his knees but denied any direct back injury.  Since then has been having intermittent back pain worse for the last 1 day and was unable to get out of bed today to walk.  Pain located in the left lower back without any radiation.  Denied any weakness of lower extremity, paresthesia, saddle anesthesia, bowel or bladder disturbance.  Denies any back surgery in the past.  Review of Systems   Constitutional:  Positive for activity change.   Eyes: Negative.    Respiratory: Negative.     Cardiovascular: Negative.    Gastrointestinal: Negative.    Endocrine: Negative.    Genitourinary: Negative.    Musculoskeletal:  Positive for back pain and gait problem.   Skin: Negative.    Allergic/Immunologic: Negative.    Hematological: Negative.    Psychiatric/Behavioral: Negative.         Historical Information   Past Medical History:   Diagnosis Date    Hypertension      History reviewed. No pertinent surgical history.  Social History     Tobacco Use    Smoking status: Never    Smokeless tobacco: Never   Vaping Use    Vaping status: Never Used   Substance and Sexual Activity    Alcohol use: Yes    Drug use: Not Currently    Sexual activity: Not on file     E-Cigarette/Vaping    E-Cigarette Use Never User      E-Cigarette/Vaping Substances       Social History:  Marital Status: /[employer]   O  Meds/Allergies   I have reviewed home medications with patient personally.  Prior to Admission medications    Not on File     No Known Allergies    Objective :  Temp:  [98.2 °F (36.8 °C)] 98.2 °F (36.8 °C)  HR:  [74-93] 77  BP: (153-193)/() 153/77  Resp:  [18] 18  SpO2:  [92 %-98 %] 95 %  O2 Device: None (Room air)    Physical Exam  Constitutional:       Appearance: He is obese. He is ill-appearing.   HENT:      Head: Normocephalic.      Nose: Nose normal.      Mouth/Throat:      Mouth: Mucous membranes are moist.   Eyes:       Extraocular Movements: Extraocular movements intact.      Pupils: Pupils are equal, round, and reactive to light.   Cardiovascular:      Rate and Rhythm: Normal rate.   Pulmonary:      Effort: Pulmonary effort is normal.   Abdominal:      General: Abdomen is flat. Bowel sounds are normal.      Palpations: Abdomen is soft.   Musculoskeletal:      Comments: Mild lower lumbar tenderness   Skin:     General: Skin is warm and dry.   Neurological:      General: No focal deficit present.      Mental Status: He is alert. Mental status is at baseline.      Cranial Nerves: No cranial nerve deficit.      Sensory: No sensory deficit.      Motor: No weakness.      Coordination: Coordination normal.      Gait: Gait normal.          Lines/Drains:            Lab Results: I have reviewed the following results:  Results from last 7 days   Lab Units 12/12/24  1859   WBC Thousand/uL 8.28   HEMOGLOBIN g/dL 15.9   HEMATOCRIT % 46.6   PLATELETS Thousands/uL 277   SEGS PCT % 82*   LYMPHO PCT % 10*   MONO PCT % 6   EOS PCT % 1     Results from last 7 days   Lab Units 12/12/24  1859   SODIUM mmol/L 136   POTASSIUM mmol/L 3.9   CHLORIDE mmol/L 101   CO2 mmol/L 25   BUN mg/dL 13   CREATININE mg/dL 0.80   ANION GAP mmol/L 10   CALCIUM mg/dL 9.5   GLUCOSE RANDOM mg/dL 156*             Lab Results   Component Value Date    HGBA1C 6.7 (H) 09/10/2024           Imaging Results Review: I reviewed radiology reports from this admission including: CT abdomen/pelvis.      Administrative Statements       ** Please Note: This note has been constructed using a voice recognition system. **